# Patient Record
Sex: FEMALE | Race: WHITE | NOT HISPANIC OR LATINO
[De-identification: names, ages, dates, MRNs, and addresses within clinical notes are randomized per-mention and may not be internally consistent; named-entity substitution may affect disease eponyms.]

---

## 2018-11-28 ENCOUNTER — APPOINTMENT (OUTPATIENT)
Dept: PHYSICAL MEDICINE AND REHAB | Facility: CLINIC | Age: 73
End: 2018-11-28

## 2022-05-03 ENCOUNTER — RESULT REVIEW (OUTPATIENT)
Age: 77
End: 2022-05-03

## 2022-05-03 ENCOUNTER — OUTPATIENT (OUTPATIENT)
Dept: OUTPATIENT SERVICES | Facility: HOSPITAL | Age: 77
LOS: 1 days | End: 2022-05-03
Payer: MEDICARE

## 2022-05-03 ENCOUNTER — APPOINTMENT (OUTPATIENT)
Dept: ORTHOPEDIC SURGERY | Facility: CLINIC | Age: 77
End: 2022-05-03
Payer: MEDICARE

## 2022-05-03 VITALS
WEIGHT: 144 LBS | OXYGEN SATURATION: 96 % | TEMPERATURE: 98 F | HEIGHT: 67 IN | DIASTOLIC BLOOD PRESSURE: 66 MMHG | BODY MASS INDEX: 22.6 KG/M2 | HEART RATE: 69 BPM | SYSTOLIC BLOOD PRESSURE: 119 MMHG

## 2022-05-03 DIAGNOSIS — M19.049 PRIMARY OSTEOARTHRITIS, UNSPECIFIED HAND: ICD-10-CM

## 2022-05-03 DIAGNOSIS — E78.5 HYPERLIPIDEMIA, UNSPECIFIED: ICD-10-CM

## 2022-05-03 DIAGNOSIS — M17.0 BILATERAL PRIMARY OSTEOARTHRITIS OF KNEE: ICD-10-CM

## 2022-05-03 DIAGNOSIS — I10 ESSENTIAL (PRIMARY) HYPERTENSION: ICD-10-CM

## 2022-05-03 DIAGNOSIS — Z78.9 OTHER SPECIFIED HEALTH STATUS: ICD-10-CM

## 2022-05-03 DIAGNOSIS — M19.072 PRIMARY OSTEOARTHRITIS, RIGHT ANKLE AND FOOT: ICD-10-CM

## 2022-05-03 DIAGNOSIS — Z85.3 PERSONAL HISTORY OF MALIGNANT NEOPLASM OF BREAST: ICD-10-CM

## 2022-05-03 DIAGNOSIS — Z82.69 FAMILY HISTORY OF OTHER DISEASES OF THE MUSCULOSKELETAL SYSTEM AND CONNECTIVE TISSUE: ICD-10-CM

## 2022-05-03 DIAGNOSIS — M19.071 PRIMARY OSTEOARTHRITIS, RIGHT ANKLE AND FOOT: ICD-10-CM

## 2022-05-03 DIAGNOSIS — M47.9 SPONDYLOSIS, UNSPECIFIED: ICD-10-CM

## 2022-05-03 DIAGNOSIS — Z82.62 FAMILY HISTORY OF OSTEOPOROSIS: ICD-10-CM

## 2022-05-03 PROCEDURE — 20610 DRAIN/INJ JOINT/BURSA W/O US: CPT | Mod: 50

## 2022-05-03 PROCEDURE — 99204 OFFICE O/P NEW MOD 45 MIN: CPT | Mod: 25

## 2022-05-03 PROCEDURE — 73564 X-RAY EXAM KNEE 4 OR MORE: CPT

## 2022-05-03 PROCEDURE — 73564 X-RAY EXAM KNEE 4 OR MORE: CPT | Mod: 26,50

## 2022-05-04 PROBLEM — M17.0 PRIMARY OSTEOARTHRITIS OF BOTH KNEES: Status: ACTIVE | Noted: 2022-05-04

## 2022-05-04 NOTE — HISTORY OF PRESENT ILLNESS
[de-identified] : Jodie Germain is a 76 yo woman who presents with ongoing knee issues for quite sometime. She reports progressive stiffness and pain in both knees. Her left knee is worse than her right knee. She has pain rising from a seated position and pain with stairs. She has intermittent swelling. She has difficulty walking long distances. She deneis any locking or buckling

## 2022-05-04 NOTE — PHYSICAL EXAM
[de-identified] : The patient is a well developed, well nourished female in no apparent distress. She is alert and oriented X 3 with a pleasant mood and appropriate affect.\par \par On physical examination of the right knee, her ROM is 0-120 degrees. The patient walks with a normal gait and stands in neutral alignment. There is no effusion. No warmth or erythema is noted. The patella is tender to palpation medially and laterally. There is patellofemoral crepitus noted. The apprehension and grind tests are negative. The extensor mechanism is intact. There is no joint line tenderness. The Suly sign is absent. The Lachman and pivot shift tests are negative. There is no varus or valgus laxity at 0 or 30 degrees. No posterolateral or anteromedial laxity is noted. No masses are palpable. No other soft tissue or bony tenderness is noted. There is some tenderness noted on palpation of the IT band. Quadriceps weakness is noted. Neurovascular function is intact.\par \par On physical examination of the left knee, her ROM is 0-120 degrees. The patient walks with a normal gait and stands in neutral alignment. There is trace  effusion. No warmth or erythema is noted. The patella is tender to palpation medially and laterally. There is patellofemoral crepitus noted. The apprehension and grind tests are negative. The extensor mechanism is intact. There is no joint line tenderness. The Suly sign is absent. The Lachman and pivot shift tests are negative. There is no varus or valgus laxity at 0 or 30 degrees. No posterolateral or anteromedial laxity is noted. No masses are palpable. No other soft tissue or bony tenderness is noted. There is some tenderness noted on palpation of the IT band. Quadriceps weakness is noted. Neurovascular function is intact. 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

## 2022-05-04 NOTE — DISCUSSION/SUMMARY
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 She received Monovisc injections today. She will slowly increase her activities as tolerated. She will call if any issues arise

## 2022-05-04 NOTE — PROCEDURE
[de-identified] : \par Under strict sterile technique, both knees were  prepped with Betadine. Using the superolateral approach, with the patient supine, a 4mL injection of Monovisc was administered intra-articularly into each knee. The patient tolerated the procedure well. The patient was instructed to avoid vigorous exercise for 48 hours and will apply ice to the knee for 20 minutes 2-3 times per day if discomfort occurs. Patient will return on an as needed basis. The patient will call if any questions or problems should arise.\par \par MonoVisc injection - Bilateral knee joints\par Lot #: [id number]\par Exp: 04-\par Man: [employer]\par NDC: [address]-91

## 2022-05-04 NOTE — END OF VISIT
[FreeTextEntry3] : All medical record entries made by ELIEZER Hilton, acting as a scribe for this encounter under the direction of Scott Araujo MD . I have reviewed the chart and agree that the record accurately reflects my personal performance of the history, physical exam, assessment and plan. I have also personally directed, reviewed, and agreed with the chart.

## 2022-05-06 PROBLEM — Z78.9 NEVER SMOKED CIGARETTES: Status: ACTIVE | Noted: 2022-05-03

## 2022-05-06 PROBLEM — Z78.9 EXERCISES 5 TO 6 TIMES PER WEEK: Status: ACTIVE | Noted: 2022-05-03

## 2022-05-06 PROBLEM — M19.049 HAND ARTHRITIS: Status: RESOLVED | Noted: 2022-05-03 | Resolved: 2022-05-06

## 2022-05-06 PROBLEM — M19.071 ARTHRITIS OF BOTH FEET: Status: RESOLVED | Noted: 2022-05-03 | Resolved: 2022-05-06

## 2022-05-06 PROBLEM — Z82.69 FAMILY HISTORY OF OSTEOARTHRITIS: Status: ACTIVE | Noted: 2022-05-03

## 2022-05-06 PROBLEM — E78.5 HYPERLIPIDEMIA: Status: ACTIVE | Noted: 2022-05-03

## 2022-05-06 PROBLEM — M47.9 ARTHRITIS OF BACK: Status: RESOLVED | Noted: 2022-05-03 | Resolved: 2022-05-06

## 2022-05-06 PROBLEM — I10 HYPERTENSION: Status: ACTIVE | Noted: 2022-05-03

## 2022-05-06 PROBLEM — Z78.9 NON-SMOKER: Status: ACTIVE | Noted: 2022-05-03

## 2022-05-06 PROBLEM — Z85.3 HISTORY OF MALIGNANT NEOPLASM OF BREAST: Status: RESOLVED | Noted: 2022-05-03 | Resolved: 2022-05-06

## 2022-05-06 PROBLEM — Z82.62 FAMILY HISTORY OF OSTEOPOROSIS: Status: ACTIVE | Noted: 2022-05-03

## 2022-05-06 RX ORDER — LISINOPRIL 5 MG/1
5 TABLET ORAL
Refills: 0 | Status: ACTIVE | COMMUNITY

## 2022-05-06 RX ORDER — ACETAMINOPHEN 500 MG
500 TABLET ORAL
Refills: 0 | Status: ACTIVE | COMMUNITY

## 2022-05-06 RX ORDER — NEBIVOLOL HYDROCHLORIDE 2.5 MG/1
2.5 TABLET ORAL
Refills: 0 | Status: ACTIVE | COMMUNITY

## 2022-05-06 RX ORDER — ZOLPIDEM TARTRATE 5 MG/1
5 TABLET, FILM COATED ORAL
Refills: 0 | Status: ACTIVE | COMMUNITY

## 2022-05-06 RX ORDER — ATORVASTATIN CALCIUM 80 MG/1
TABLET, FILM COATED ORAL
Refills: 0 | Status: ACTIVE | COMMUNITY

## 2024-03-25 PROBLEM — Z85.3 PERSONAL HISTORY OF BREAST CANCER: Status: ACTIVE | Noted: 2024-03-25

## 2024-03-26 ENCOUNTER — NON-APPOINTMENT (OUTPATIENT)
Age: 79
End: 2024-03-26

## 2024-03-26 DIAGNOSIS — Z85.3 PERSONAL HISTORY OF MALIGNANT NEOPLASM OF BREAST: ICD-10-CM

## 2024-09-05 ENCOUNTER — APPOINTMENT (OUTPATIENT)
Dept: HEMATOLOGY ONCOLOGY | Facility: CLINIC | Age: 79
End: 2024-09-05
Payer: MEDICARE

## 2024-09-05 VITALS
SYSTOLIC BLOOD PRESSURE: 151 MMHG | WEIGHT: 148 LBS | HEIGHT: 66 IN | RESPIRATION RATE: 18 BRPM | BODY MASS INDEX: 23.78 KG/M2 | TEMPERATURE: 98.2 F | DIASTOLIC BLOOD PRESSURE: 86 MMHG | OXYGEN SATURATION: 96 % | HEART RATE: 76 BPM

## 2024-09-05 DIAGNOSIS — N95.1 MENOPAUSAL AND FEMALE CLIMACTERIC STATES: ICD-10-CM

## 2024-09-05 DIAGNOSIS — M81.0 AGE-RELATED OSTEOPOROSIS W/OUT CURRENT PATHOLOGICAL FRACTURE: ICD-10-CM

## 2024-09-05 DIAGNOSIS — I10 ESSENTIAL (PRIMARY) HYPERTENSION: ICD-10-CM

## 2024-09-05 DIAGNOSIS — E78.5 HYPERLIPIDEMIA, UNSPECIFIED: ICD-10-CM

## 2024-09-05 DIAGNOSIS — Z85.3 PERSONAL HISTORY OF MALIGNANT NEOPLASM OF BREAST: ICD-10-CM

## 2024-09-05 PROCEDURE — 99204 OFFICE O/P NEW MOD 45 MIN: CPT

## 2024-09-05 PROCEDURE — 99214 OFFICE O/P EST MOD 30 MIN: CPT

## 2024-09-05 PROCEDURE — G2211 COMPLEX E/M VISIT ADD ON: CPT

## 2024-09-05 RX ORDER — ROSUVASTATIN CALCIUM 5 MG/1
5 TABLET, FILM COATED ORAL DAILY
Refills: 0 | Status: ACTIVE | COMMUNITY
Start: 2024-09-05

## 2024-09-05 RX ORDER — ESTRADIOL 10 UG/1
10 TABLET, FILM COATED VAGINAL
Refills: 0 | Status: ACTIVE | COMMUNITY
Start: 2024-09-05

## 2024-09-05 RX ORDER — ASPIRIN ENTERIC COATED TABLETS 81 MG 81 MG/1
81 TABLET, DELAYED RELEASE ORAL
Refills: 0 | Status: ACTIVE | COMMUNITY
Start: 2024-09-05

## 2024-09-05 RX ORDER — LOSARTAN POTASSIUM 25 MG/1
25 TABLET, FILM COATED ORAL
Refills: 0 | Status: ACTIVE | COMMUNITY
Start: 2024-09-05

## 2024-09-05 RX ORDER — FAMOTIDINE 20 MG/1
20 TABLET, FILM COATED ORAL
Refills: 0 | Status: ACTIVE | COMMUNITY
Start: 2024-09-05

## 2024-09-05 RX ORDER — LORATADINE 10 MG
17 TABLET,DISINTEGRATING ORAL
Refills: 0 | Status: ACTIVE | COMMUNITY
Start: 2024-09-05

## 2024-09-06 PROBLEM — N95.1 MENOPAUSAL VAGINAL DRYNESS: Status: ACTIVE | Noted: 2024-09-06

## 2024-09-06 PROBLEM — M81.0 OSTEOPOROSIS WITHOUT CURRENT PATHOLOGICAL FRACTURE, UNSPECIFIED OSTEOPOROSIS TYPE: Status: ACTIVE | Noted: 2024-09-06

## 2024-09-06 NOTE — PHYSICAL EXAM
[Restricted in physically strenuous activity but ambulatory and able to carry out work of a light or sedentary nature] : Status 1- Restricted in physically strenuous activity but ambulatory and able to carry out work of a light or sedentary nature, e.g., light house work, office work [Normal] : affect appropriate [de-identified] : loop recorder Lt CW [de-identified] : Rt unremarkable, S/P Lt WLE RT

## 2024-09-06 NOTE — ASSESSMENT
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  Declined anti E. Pt deciding whether to remove loop recorder "". Annual MMG/US . F/u osteoporosis- Dr Whitman. Pulmonary f/u. Reviewed diet and exercise. Has health proxy and advance directive. F/u 1 yr [AdvancecareDate] : 9/5/24

## 2024-09-06 NOTE — ASSESSMENT
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  Declined anti E. Pt deciding whether to remove loop recorder "". Annual MMG/US . F/u osteoporosis- Dr Wihtman. Pulmonary f/u. Reviewed diet and exercise. Has health proxy and advance directive. F/u 1 yr [AdvancecareDate] : 9/5/24

## 2024-09-06 NOTE — PHYSICAL EXAM
[Restricted in physically strenuous activity but ambulatory and able to carry out work of a light or sedentary nature] : Status 1- Restricted in physically strenuous activity but ambulatory and able to carry out work of a light or sedentary nature, e.g., light house work, office work [Normal] : affect appropriate [de-identified] : loop recorder Lt CW [de-identified] : Rt unremarkable, S/P Lt WLE RT

## 2024-09-06 NOTE — REVIEW OF SYSTEMS
[Diarrhea: Grade 0] : Diarrhea: Grade 0 [Negative] : Allergic/Immunologic [SOB on Exertion] : shortness of breath during exertion [FreeTextEntry2] : gained wt

## 2024-09-06 NOTE — HISTORY OF PRESENT ILLNESS
[de-identified] : Recurrent DCIS Lt breast.  In , pt underwent Lt WLE for DCIS Lt breast intermediate nuclear grade and calcifications, LCIS was also seen, core bx. WLE -focal residual LCIS was seen, no DCIS.  RT was not given.  Pt took Colon briefly, unable to tolerate due to joint pains.  Pt has been followed with close surveillance.  Genetic testing (? multisite) was done in the distant past.  Bilat mammo and US 22, indeterminate Lt breast calcifications LOQ, 2 mm.  Core bx DCIS intermediate to high grade with solid and micropapillary features associated with necrosis and calcifications, ER/CT pos.  22 Lt WLE, DCIS at least 8 mm, grade 2 intermediate and grade 3 high, comedonecrosis present, margins neg, ER > 95%, and CT 40%.  S/P RT. Refused anti- E Bilat mmg/US  - JACOB. BMD osteoporosis - reclast.last 10/23 Last seen .Bilat MMG/US  JACOB. Had pneumonia in May.- has f/u with pulmonary. Had Covid mid July - bronchitis. Sched for PFTS. Recent cardiac eval neg. Occ BRYAN, cough resolved. c/o wt gain  CALLERGIES: [facility].  PMH: Menarche 12, LMP 49, G0, HRT 50-60, currently Vaginal Estrogen.Gyn 10/23  Hx HTN, elevated cholesterol.  Has loop recorder Lt chest wall, hx SVT.  Vulvodynia. .  Hx LE venous insufficiency.  Colonoscopy 10/22.  BMD , osteoporosis   Pt has healthcare proxy and advance directive.. Hx vertigo  SOCIAL HX: Retired , lives with her , exercises 30-40 minutes 5/week and walks.  No alcohol.  No cigarettes for 39 Y, 1 ppd 11 Y.  FHX: Inv[employer]ae 47 gene panel neg [place] descent.  Mother  90, had 2 sisters, 1  of breast CA 54.  Father  92.  Paternal grandmother  breast CA at 92.  A paternal aunt had breast CA 87.  Father also had 1 brother.  Pt has 1 brother urothelial CA at 72 and 1 sister.

## 2024-09-06 NOTE — ASSESSMENT
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  Declined anti E. Pt deciding whether to remove loop recorder "". Annual MMG/US . F/u osteoporosis- Dr Whitman. Pulmonary f/u. Reviewed diet and exercise. Has health proxy and advance directive. F/u 1 yr [AdvancecareDate] : 9/5/24

## 2024-09-06 NOTE — PHYSICAL EXAM
[Restricted in physically strenuous activity but ambulatory and able to carry out work of a light or sedentary nature] : Status 1- Restricted in physically strenuous activity but ambulatory and able to carry out work of a light or sedentary nature, e.g., light house work, office work [Normal] : affect appropriate [de-identified] : loop recorder Lt CW [de-identified] : Rt unremarkable, S/P Lt WLE RT

## 2024-09-06 NOTE — HISTORY OF PRESENT ILLNESS
[de-identified] : Recurrent DCIS Lt breast.  In , pt underwent Lt WLE for DCIS Lt breast intermediate nuclear grade and calcifications, LCIS was also seen, core bx. WLE -focal residual LCIS was seen, no DCIS.  RT was not given.  Pt took Colon briefly, unable to tolerate due to joint pains.  Pt has been followed with close surveillance.  Genetic testing (? multisite) was done in the distant past.  Bilat mammo and US 22, indeterminate Lt breast calcifications LOQ, 2 mm.  Core bx DCIS intermediate to high grade with solid and micropapillary features associated with necrosis and calcifications, ER/SD pos.  22 Lt WLE, DCIS at least 8 mm, grade 2 intermediate and grade 3 high, comedonecrosis present, margins neg, ER > 95%, and SD 40%.  S/P RT. Refused anti- E Bilat mmg/US  - JACOB. BMD osteoporosis - reclast.last 10/23 Last seen .Bilat MMG/US  JACOB. Had pneumonia in May.- has f/u with pulmonary. Had Covid mid July - bronchitis. Sched for PFTS. Recent cardiac eval neg. Occ BRYAN, cough resolved. c/o wt gain  CALLERGIES: [facility].  PMH: Menarche 12, LMP 49, G0, HRT 50-60, currently Vaginal Estrogen.Gyn 10/23  Hx HTN, elevated cholesterol.  Has loop recorder Lt chest wall, hx SVT.  Vulvodynia. .  Hx LE venous insufficiency.  Colonoscopy 10/22.  BMD , osteoporosis   Pt has healthcare proxy and advance directive.. Hx vertigo  SOCIAL HX: Retired , lives with her , exercises 30-40 minutes 5/week and walks.  No alcohol.  No cigarettes for 39 Y, 1 ppd 11 Y.  FHX: Inv[employer]ae 47 gene panel neg [place] descent.  Mother  90, had 2 sisters, 1  of breast CA 54.  Father  92.  Paternal grandmother  breast CA at 92.  A paternal aunt had breast CA 87.  Father also had 1 brother.  Pt has 1 brother urothelial CA at 72 and 1 sister.

## 2024-09-06 NOTE — HISTORY OF PRESENT ILLNESS
[de-identified] : Recurrent DCIS Lt breast.  In , pt underwent Lt WLE for DCIS Lt breast intermediate nuclear grade and calcifications, LCIS was also seen, core bx. WLE -focal residual LCIS was seen, no DCIS.  RT was not given.  Pt took Colon briefly, unable to tolerate due to joint pains.  Pt has been followed with close surveillance.  Genetic testing (? multisite) was done in the distant past.  Bilat mammo and US 22, indeterminate Lt breast calcifications LOQ, 2 mm.  Core bx DCIS intermediate to high grade with solid and micropapillary features associated with necrosis and calcifications, ER/NV pos.  22 Lt WLE, DCIS at least 8 mm, grade 2 intermediate and grade 3 high, comedonecrosis present, margins neg, ER > 95%, and NV 40%.  S/P RT. Refused anti- E Bilat mmg/US  - JACOB. BMD osteoporosis - reclast.last 10/23 Last seen .Bilat MMG/US  JACOB. Had pneumonia in May.- has f/u with pulmonary. Had Covid mid July - bronchitis. Sched for PFTS. Recent cardiac eval neg. Occ BRYAN, cough resolved. c/o wt gain  CALLERGIES: [facility].  PMH: Menarche 12, LMP 49, G0, HRT 50-60, currently Vaginal Estrogen.Gyn 10/23  Hx HTN, elevated cholesterol.  Has loop recorder Lt chest wall, hx SVT.  Vulvodynia. .  Hx LE venous insufficiency.  Colonoscopy 10/22.  BMD , osteoporosis   Pt has healthcare proxy and advance directive.. Hx vertigo  SOCIAL HX: Retired , lives with her , exercises 30-40 minutes 5/week and walks.  No alcohol.  No cigarettes for 39 Y, 1 ppd 11 Y.  FHX: Invitae 47 gene panel neg [place] descent.  Mother  90, had 2 sisters, 1  of breast CA 54.  Father  92.  Paternal grandmother  breast CA at 92.  A paternal aunt had breast CA 87.  Father also had 1 brother.  Pt has 1 brother urothelial CA at 72 and 1 sister.

## 2024-11-14 ENCOUNTER — OUTPATIENT (OUTPATIENT)
Dept: OUTPATIENT SERVICES | Facility: HOSPITAL | Age: 79
LOS: 1 days | End: 2024-11-14
Payer: MEDICARE

## 2024-11-14 ENCOUNTER — NON-APPOINTMENT (OUTPATIENT)
Age: 79
End: 2024-11-14

## 2024-11-14 ENCOUNTER — RESULT REVIEW (OUTPATIENT)
Age: 79
End: 2024-11-14

## 2024-11-14 ENCOUNTER — APPOINTMENT (OUTPATIENT)
Dept: ORTHOPEDIC SURGERY | Facility: CLINIC | Age: 79
End: 2024-11-14
Payer: MEDICARE

## 2024-11-14 VITALS
WEIGHT: 148 LBS | BODY MASS INDEX: 23.78 KG/M2 | TEMPERATURE: 97.9 F | DIASTOLIC BLOOD PRESSURE: 80 MMHG | HEIGHT: 66 IN | OXYGEN SATURATION: 96 % | HEART RATE: 69 BPM | SYSTOLIC BLOOD PRESSURE: 145 MMHG

## 2024-11-14 DIAGNOSIS — M22.2X1 PATELLOFEMORAL DISORDERS, RIGHT KNEE: ICD-10-CM

## 2024-11-14 DIAGNOSIS — M22.2X2 PATELLOFEMORAL DISORDERS, RIGHT KNEE: ICD-10-CM

## 2024-11-14 PROCEDURE — 73564 X-RAY EXAM KNEE 4 OR MORE: CPT | Mod: 26,50

## 2024-11-14 PROCEDURE — 99213 OFFICE O/P EST LOW 20 MIN: CPT

## 2024-11-14 PROCEDURE — 73564 X-RAY EXAM KNEE 4 OR MORE: CPT
